# Patient Record
Sex: MALE | Race: ASIAN | URBAN - METROPOLITAN AREA
[De-identification: names, ages, dates, MRNs, and addresses within clinical notes are randomized per-mention and may not be internally consistent; named-entity substitution may affect disease eponyms.]

---

## 2021-08-09 ENCOUNTER — OFFICE VISIT (OUTPATIENT)
Dept: URGENT CARE | Facility: URGENT CARE | Age: 26
End: 2021-08-09
Payer: COMMERCIAL

## 2021-08-09 VITALS
RESPIRATION RATE: 16 BRPM | HEART RATE: 82 BPM | SYSTOLIC BLOOD PRESSURE: 126 MMHG | TEMPERATURE: 98.2 F | OXYGEN SATURATION: 97 % | DIASTOLIC BLOOD PRESSURE: 88 MMHG | WEIGHT: 215 LBS | HEIGHT: 70 IN | BODY MASS INDEX: 30.78 KG/M2

## 2021-08-09 DIAGNOSIS — J30.2 SEASONAL ALLERGIC RHINITIS, UNSPECIFIED TRIGGER: ICD-10-CM

## 2021-08-09 DIAGNOSIS — J01.90 ACUTE NON-RECURRENT SINUSITIS, UNSPECIFIED LOCATION: Primary | ICD-10-CM

## 2021-08-09 PROCEDURE — 99204 OFFICE O/P NEW MOD 45 MIN: CPT | Performed by: PHYSICIAN ASSISTANT

## 2021-08-09 RX ORDER — FLUTICASONE PROPIONATE 50 MCG
1 SPRAY, SUSPENSION (ML) NASAL DAILY
Qty: 18.2 ML | Refills: 1 | Status: SHIPPED | OUTPATIENT
Start: 2021-08-09

## 2021-08-09 RX ORDER — CETIRIZINE HYDROCHLORIDE 10 MG/1
10 TABLET ORAL EVERY EVENING
Qty: 30 TABLET | Refills: 3 | Status: SHIPPED | OUTPATIENT
Start: 2021-08-09

## 2021-08-09 ASSESSMENT — MIFFLIN-ST. JEOR: SCORE: 1966.48

## 2021-08-09 NOTE — PROGRESS NOTES
"Patient presents with:  Headache: Sx ongoing for 1 month- right side neck pain, recently increased to back of neck and head on right side. Pain behind right eye. Throbbing pain    (J01.90) Acute non-recurrent sinusitis, unspecified location  (primary encounter diagnosis)  Comment:   Plan: fluticasone (FLONASE) 50 MCG/ACT nasal spray,         amoxicillin-clavulanate (AUGMENTIN) 875-125 MG         tablet            (J30.2) Seasonal allergic rhinitis, unspecified trigger  Comment:   Plan: cetirizine (ZYRTEC) 10 MG tablet          Saline nasal spray    Ibuprofen as needed for pain.      SUBJECTIVE:   Valente Esteban is a 26 year old male who presents today with right sided eye throbbing and headache.  Onset was about a month ago.  Had runny nose initially, took sinus decongestant.    Denies any fevers.  Denies any vision changes.      Sneezing.      Has had some trouble with allergies in past.        Current Outpatient Medications   Medication Sig Dispense Refill     Multiple Vitamins-Iron (DAILY-LETHA/IRON/BETA-CAROTENE) TABS TAKE 1 TABLET BY MOUTH DAILY. (Patient not taking: Reported on 10/19/2020) 30 tablet 7     Social History     Tobacco Use     Smoking status: Never Smoker     Smokeless tobacco: Never Used   Substance Use Topics     Alcohol use: Not on file     Family History   Problem Relation Age of Onset     Diabetes Mother      Diabetes Father          ROS:    10 point ROS of systems including Constitutional, Eyes, Respiratory, Cardiovascular, Gastroenterology, Genitourinary, Integumentary, Muscularskeletal, Psychiatric ,neurological were all negative except for pertinent positives noted in my HPI       OBJECTIVE:  /88   Pulse 82   Temp 98.2  F (36.8  C) (Oral)   Resp 16   Ht 1.778 m (5' 10\")   Wt 97.5 kg (215 lb)   SpO2 97%   BMI 30.85 kg/m    Physical Exam:  GENERAL APPEARANCE: healthy, alert and no distress  EYES: EOMI,  PERRL, conjunctiva clear  HENT: ear canals and TM's normal.  Nose and mouth " without ulcers, erythema or lesions  HENT: nasal turbinates erythematous, swollen and rhinorrhea yellow  NECK: supple, nontender, no lymphadenopathy  RESP: lungs clear to auscultation - no rales, rhonchi or wheezes  CV: regular rates and rhythm, normal S1 S2, no murmur noted  ABDOMEN:  soft, nontender, no HSM or masses and bowel sounds normal  SKIN: no suspicious lesions or rashes

## 2021-08-09 NOTE — PATIENT INSTRUCTIONS
(J01.90) Acute non-recurrent sinusitis, unspecified location  (primary encounter diagnosis)  Comment:   Plan: fluticasone (FLONASE) 50 MCG/ACT nasal spray,         amoxicillin-clavulanate (AUGMENTIN) 875-125 MG         tablet            (J30.2) Seasonal allergic rhinitis, unspecified trigger  Comment:   Plan: cetirizine (ZYRTEC) 10 MG tablet          Saline nasal spray    Ibuprofen as needed for pain.

## 2021-08-16 ENCOUNTER — OFFICE VISIT (OUTPATIENT)
Dept: FAMILY MEDICINE | Facility: CLINIC | Age: 26
End: 2021-08-16
Payer: COMMERCIAL

## 2021-08-16 VITALS
WEIGHT: 233 LBS | SYSTOLIC BLOOD PRESSURE: 130 MMHG | DIASTOLIC BLOOD PRESSURE: 89 MMHG | OXYGEN SATURATION: 97 % | HEIGHT: 70 IN | HEART RATE: 78 BPM | RESPIRATION RATE: 18 BRPM | BODY MASS INDEX: 33.36 KG/M2 | TEMPERATURE: 98 F

## 2021-08-16 DIAGNOSIS — R79.89 ELEVATED LFTS: ICD-10-CM

## 2021-08-16 DIAGNOSIS — R51.9 RECURRENT HEADACHE: Primary | ICD-10-CM

## 2021-08-16 DIAGNOSIS — F33.1 MODERATE EPISODE OF RECURRENT MAJOR DEPRESSIVE DISORDER (H): ICD-10-CM

## 2021-08-16 LAB
BASOPHILS # BLD AUTO: 0 10E3/UL (ref 0–0.2)
BASOPHILS NFR BLD AUTO: 0 %
EOSINOPHIL # BLD AUTO: 0.2 10E3/UL (ref 0–0.7)
EOSINOPHIL NFR BLD AUTO: 4 %
ERYTHROCYTE [DISTWIDTH] IN BLOOD BY AUTOMATED COUNT: 12.9 % (ref 10–15)
FOLATE SERPL-MCNC: 19.3 NG/ML
HBA1C MFR BLD: 5.4 % (ref 0–5.6)
HCT VFR BLD AUTO: 47.3 % (ref 40–53)
HGB BLD-MCNC: 16.3 G/DL (ref 13.3–17.7)
LYMPHOCYTES # BLD AUTO: 1.6 10E3/UL (ref 0.8–5.3)
LYMPHOCYTES NFR BLD AUTO: 32 %
MCH RBC QN AUTO: 27.8 PG (ref 26.5–33)
MCHC RBC AUTO-ENTMCNC: 34.5 G/DL (ref 31.5–36.5)
MCV RBC AUTO: 81 FL (ref 78–100)
MONOCYTES # BLD AUTO: 0.6 10E3/UL (ref 0–1.3)
MONOCYTES NFR BLD AUTO: 11 %
NEUTROPHILS # BLD AUTO: 2.7 10E3/UL (ref 1.6–8.3)
NEUTROPHILS NFR BLD AUTO: 53 %
PLATELET # BLD AUTO: 291 10E3/UL (ref 150–450)
RBC # BLD AUTO: 5.87 10E6/UL (ref 4.4–5.9)
VIT B12 SERPL-MCNC: 484 PG/ML (ref 193–986)
WBC # BLD AUTO: 5 10E3/UL (ref 4–11)

## 2021-08-16 PROCEDURE — 82746 ASSAY OF FOLIC ACID SERUM: CPT | Performed by: INTERNAL MEDICINE

## 2021-08-16 PROCEDURE — 82728 ASSAY OF FERRITIN: CPT | Performed by: INTERNAL MEDICINE

## 2021-08-16 PROCEDURE — 82607 VITAMIN B-12: CPT | Performed by: INTERNAL MEDICINE

## 2021-08-16 PROCEDURE — 83036 HEMOGLOBIN GLYCOSYLATED A1C: CPT | Performed by: INTERNAL MEDICINE

## 2021-08-16 PROCEDURE — 80050 GENERAL HEALTH PANEL: CPT | Performed by: INTERNAL MEDICINE

## 2021-08-16 PROCEDURE — 36415 COLL VENOUS BLD VENIPUNCTURE: CPT | Performed by: INTERNAL MEDICINE

## 2021-08-16 PROCEDURE — 99214 OFFICE O/P EST MOD 30 MIN: CPT | Performed by: INTERNAL MEDICINE

## 2021-08-16 ASSESSMENT — MIFFLIN-ST. JEOR: SCORE: 2048.13

## 2021-08-16 ASSESSMENT — PATIENT HEALTH QUESTIONNAIRE - PHQ9: SUM OF ALL RESPONSES TO PHQ QUESTIONS 1-9: 18

## 2021-08-16 NOTE — PROGRESS NOTES
Assessment & Plan     Recurrent headache  Improving with sinusitis treatment plan but not resolved. Ddx includes depression vs migraine (sleep schedule triggers) vs other  Recommend check labs and eval with neuro  Agree with updating eye exam, as well  - Adult Neurology Referral  - CBC with Platelets & Differential  - Comprehensive metabolic panel  - Hemoglobin A1c  - TSH with free T4 reflex  - Vitamin B12  - Folate  - Ferritin    Moderate episode of recurrent major depressive disorder (H)  PHQ 18, No si/hi  Patient has trialed a few medications in the past without improvement. Recommend optimization with therapy and psychiatrist. He is agreeable.  Check labs  Follow-up 2 months for recheck/physical  ED precautions for severely worsening mood  - MENTAL HEALTH REFERRAL  - Adult; Psychiatry; Psychiatry and Psychotherapy (Individual/Couple/Family Therapy); [facility] and [facility] [phone]; Yes; Several Medications tried and failed; Yes; We...  - CBC with Platelets & Differential  - Comprehensive metabolic panel  - Hemoglobin A1c  - TSH with free T4 reflex  - Vitamin B12  - Folate  - Ferritin      Return in about 2 months (around 10/16/2021) for Routine preventive, with me, in person, sooner if symptoms worsen or do not improve.    Azalea Guthrie DO  [facility] ARTEM Victor is a 26 year old who presents for the following health issues     HPI     New Patient/Transfer of Care  ED/UC Followup:    Facility:  [facility]  Date of visit: 8/9/2021  Reason for visit: Headache  Current Status: Still having headaches behind right eye         Former PCP: none  Specialists: none  Problem list and medications reviewed and updated. See below for additional notes.  Social: nonsmoker; occasional etoh  HM: COVID vaccine UTD    Started on augmentin for headaches, possible sinus infections. Has helped a little bit. Not as severe.  "Still some discomfort behind R eye. Using zyrtec and flonase too. No blurry vision. No dizziness.   Used to get stress headaches in high school. Lack of sleep. Works overnights but been doing this 2.5 years, nothing new at this point. Headaches were gone for a while, 3 years. Just came back about one month ago. Usually daily. Usually start around 1am-2am.   Some depression.   Few years  Went to mental health clinic at [place] in the past. Stopped, no real improvement but just \"dealing with it\". Tried a few meds including lexapro and thinks sertraline. Did not help.   Last eye evaluation one year ago. Plans to schedule for this year      Review of Systems   Constitutional, HEENT, cardiovascular, pulmonary, gi and gu systems are negative, except as otherwise noted.      Objective    /89 (BP Location: Right arm, Cuff Size: Adult Large)   Pulse 78   Temp 98  F (36.7  C) (Temporal)   Resp 18   Ht 1.778 m (5' 10\")   Wt 105.7 kg (233 lb)   SpO2 97%   BMI 33.43 kg/m    Body mass index is 33.43 kg/m .  Physical Exam     GENERAL APPEARANCE: AAOx3, no distress. Well developed.    PSYCH: appropriate mood and affect.                 "

## 2021-08-17 ENCOUNTER — TELEPHONE (OUTPATIENT)
Dept: FAMILY MEDICINE | Facility: CLINIC | Age: 26
End: 2021-08-17

## 2021-08-17 LAB
ALBUMIN SERPL-MCNC: 4.4 G/DL (ref 3.4–5)
ALP SERPL-CCNC: 62 U/L (ref 40–150)
ALT SERPL W P-5'-P-CCNC: 77 U/L (ref 0–70)
ANION GAP SERPL CALCULATED.3IONS-SCNC: 6 MMOL/L (ref 3–14)
AST SERPL W P-5'-P-CCNC: 40 U/L (ref 0–45)
BILIRUB SERPL-MCNC: 0.5 MG/DL (ref 0.2–1.3)
BUN SERPL-MCNC: 12 MG/DL (ref 7–30)
CALCIUM SERPL-MCNC: 9.9 MG/DL (ref 8.5–10.1)
CHLORIDE BLD-SCNC: 103 MMOL/L (ref 94–109)
CO2 SERPL-SCNC: 28 MMOL/L (ref 20–32)
CREAT SERPL-MCNC: 1 MG/DL (ref 0.66–1.25)
FERRITIN SERPL-MCNC: 241 NG/ML (ref 26–388)
GFR SERPL CREATININE-BSD FRML MDRD: >90 ML/MIN/1.73M2
GLUCOSE BLD-MCNC: 90 MG/DL (ref 70–99)
POTASSIUM BLD-SCNC: 4.4 MMOL/L (ref 3.4–5.3)
PROT SERPL-MCNC: 8.1 G/DL (ref 6.8–8.8)
SODIUM SERPL-SCNC: 137 MMOL/L (ref 133–144)
TSH SERPL DL<=0.005 MIU/L-ACNC: 2.93 MU/L (ref 0.4–4)

## 2021-08-17 NOTE — TELEPHONE ENCOUNTER
Pt was called with providers message below. He scheduled a future appt.       Azalea Guthrie DO [facility] Triage  Please let patient know labs overall look very good with normal thyroid function, normal iron stores, normal vitamin b12, not a diabetic. Liver function is a bit high and should simply be rechecked as this can be a transient cause. If remains high on recheck we will further investigate. If develops abd pain/nausea/fever, seek medical attention. Recommend repeat liver function test in 2 months, will need lab appt.

## 2021-10-17 ENCOUNTER — HEALTH MAINTENANCE LETTER (OUTPATIENT)
Age: 26
End: 2021-10-17

## 2022-10-03 ENCOUNTER — HEALTH MAINTENANCE LETTER (OUTPATIENT)
Age: 27
End: 2022-10-03

## 2023-02-11 ENCOUNTER — HEALTH MAINTENANCE LETTER (OUTPATIENT)
Age: 28
End: 2023-02-11

## 2024-03-09 ENCOUNTER — HEALTH MAINTENANCE LETTER (OUTPATIENT)
Age: 29
End: 2024-03-09